# Patient Record
Sex: FEMALE | Race: WHITE | Employment: STUDENT | URBAN - METROPOLITAN AREA
[De-identification: names, ages, dates, MRNs, and addresses within clinical notes are randomized per-mention and may not be internally consistent; named-entity substitution may affect disease eponyms.]

---

## 2019-02-08 ENCOUNTER — APPOINTMENT (OUTPATIENT)
Dept: CT IMAGING | Age: 19
End: 2019-02-08
Attending: FAMILY MEDICINE
Payer: COMMERCIAL

## 2019-02-08 ENCOUNTER — HOSPITAL ENCOUNTER (OUTPATIENT)
Age: 19
Discharge: HOME | End: 2019-02-08
Attending: FAMILY MEDICINE
Payer: COMMERCIAL

## 2019-02-08 VITALS
HEIGHT: 60 IN | TEMPERATURE: 99 F | OXYGEN SATURATION: 98 % | WEIGHT: 170 LBS | DIASTOLIC BLOOD PRESSURE: 75 MMHG | BODY MASS INDEX: 33.38 KG/M2 | HEART RATE: 66 BPM | SYSTOLIC BLOOD PRESSURE: 122 MMHG | RESPIRATION RATE: 16 BRPM

## 2019-02-08 DIAGNOSIS — J01.00 ACUTE MAXILLARY SINUSITIS, RECURRENCE NOT SPECIFIED: Primary | ICD-10-CM

## 2019-02-08 LAB
#MXD IC: 0.7 X10ˆ3/UL (ref 0.1–1)
CREAT SERPL-MCNC: 0.6 MG/DL (ref 0.55–1.02)
GLUCOSE BLD-MCNC: 106 MG/DL (ref 70–99)
HCT VFR BLD AUTO: 40.2 % (ref 35–48)
HGB BLD-MCNC: 13.7 G/DL (ref 12–16)
ISTAT BUN: 9 MG/DL (ref 8–20)
ISTAT CHLORIDE: 104 MMOL/L (ref 101–111)
ISTAT HEMATOCRIT: 41 % (ref 34–50)
ISTAT IONIZED CALCIUM: 1.23 MMOL/L
ISTAT POTASSIUM: 3.9 MMOL/L (ref 3.6–5.1)
ISTAT SODIUM: 141 MMOL/L (ref 136–144)
LYMPHOCYTES # BLD AUTO: 3.1 X10ˆ3/UL (ref 1–4)
LYMPHOCYTES NFR BLD AUTO: 40.9 %
MCH RBC QN AUTO: 29.4 PG (ref 26–34)
MCHC RBC AUTO-ENTMCNC: 34.1 G/DL (ref 31–37)
MCV RBC AUTO: 86.3 FL (ref 80–100)
MIXED CELL %: 9 %
NEUTROPHILS # BLD AUTO: 3.7 X10ˆ3/UL (ref 1.5–7.7)
NEUTROPHILS NFR BLD AUTO: 50.1 %
PLATELET # BLD AUTO: 458 X10ˆ3/UL (ref 150–450)
RBC # BLD AUTO: 4.66 X10ˆ6/UL (ref 3.8–5.3)
WBC # BLD AUTO: 7.5 X10ˆ3/UL (ref 4–11)

## 2019-02-08 PROCEDURE — 99204 OFFICE O/P NEW MOD 45 MIN: CPT

## 2019-02-08 PROCEDURE — 96360 HYDRATION IV INFUSION INIT: CPT

## 2019-02-08 PROCEDURE — 85025 COMPLETE CBC W/AUTO DIFF WBC: CPT | Performed by: FAMILY MEDICINE

## 2019-02-08 PROCEDURE — 80047 BASIC METABLC PNL IONIZED CA: CPT

## 2019-02-08 PROCEDURE — 70450 CT HEAD/BRAIN W/O DYE: CPT | Performed by: FAMILY MEDICINE

## 2019-02-08 RX ORDER — SODIUM CHLORIDE 9 MG/ML
1000 INJECTION, SOLUTION INTRAVENOUS ONCE
Status: COMPLETED | OUTPATIENT
Start: 2019-02-08 | End: 2019-02-08

## 2019-02-08 RX ORDER — AMOXICILLIN AND CLAVULANATE POTASSIUM 875; 125 MG/1; MG/1
1 TABLET, FILM COATED ORAL 2 TIMES DAILY
Qty: 20 TABLET | Refills: 0 | Status: SHIPPED | OUTPATIENT
Start: 2019-02-08 | End: 2019-02-18

## 2019-02-08 NOTE — ED INITIAL ASSESSMENT (HPI)
Pt presents today with c/o frontal headache x 1 week. Pt denies any hx of migraines. Pt states that she is sensitive to light/sound. Pt denies any n/v or fevers.

## 2019-02-11 NOTE — ED PROVIDER NOTES
Patient Seen in: [id number] [facility]    History   Patient presents with:  Headache    Stated Complaint: headache  x  5 days    HPI  This is a 57-year-old female coming in with complaints of a frontal headache that she had for the last 1 week, Ears: normal TM's and external ear canals both ears  Nose: Nares normal. Septum midline. Mucosa normal. No drainage or sinus tenderness.   Throat: lips, mucosa moist, no obstruction  Neck: Supple, FROM   Back: symmetric  Lungs: clear to auscultation bilat tablet          Refill:  0    Ct Brain Or Head (93157)    Result Date: 2/8/2019  PROCEDURE:  CT BRAIN OR HEAD ([id number])  COMPARISON:  None. INDICATIONS:  headache  x  5 days  TECHNIQUE:  Noncontrast CT scanning is performed through the brain.  Dose reduction dryness and nosebleeds discussed - to avoid / reduce the risk of this use fragrance free vaseline to apply over the nasal septal mucosa)  · Daily antihistamine - Claritin OR Zyrtec in the AM (non-drowsy) and take Benadryl 25 mg at night time  · Avoid Sudaf

## 2019-03-30 ENCOUNTER — OFFICE VISIT (OUTPATIENT)
Dept: FAMILY MEDICINE CLINIC | Facility: CLINIC | Age: 19
End: 2019-03-30
Payer: COMMERCIAL

## 2019-03-30 VITALS
DIASTOLIC BLOOD PRESSURE: 60 MMHG | HEART RATE: 99 BPM | TEMPERATURE: 98 F | OXYGEN SATURATION: 99 % | SYSTOLIC BLOOD PRESSURE: 118 MMHG | RESPIRATION RATE: 16 BRPM

## 2019-03-30 DIAGNOSIS — R10.9 ACUTE LEFT FLANK PAIN: ICD-10-CM

## 2019-03-30 DIAGNOSIS — R39.9 UTI SYMPTOMS: Primary | ICD-10-CM

## 2019-03-30 LAB
MULTISTIX LOT#: ABNORMAL NUMERIC
NITRITE, URINE: POSITIVE
PH, URINE: 5.5 (ref 4.5–8)
SPECIFIC GRAVITY: 1.02 (ref 1–1.03)
UROBILINOGEN,SEMI-QN: 0.2 MG/DL (ref 0–1.9)

## 2019-03-30 PROCEDURE — 87186 SC STD MICRODIL/AGAR DIL: CPT | Performed by: PHYSICIAN ASSISTANT

## 2019-03-30 PROCEDURE — 81003 URINALYSIS AUTO W/O SCOPE: CPT | Performed by: PHYSICIAN ASSISTANT

## 2019-03-30 PROCEDURE — 87088 URINE BACTERIA CULTURE: CPT | Performed by: PHYSICIAN ASSISTANT

## 2019-03-30 PROCEDURE — 87086 URINE CULTURE/COLONY COUNT: CPT | Performed by: PHYSICIAN ASSISTANT

## 2019-03-30 PROCEDURE — 99202 OFFICE O/P NEW SF 15 MIN: CPT | Performed by: PHYSICIAN ASSISTANT

## 2019-03-30 RX ORDER — PHENAZOPYRIDINE HYDROCHLORIDE 200 MG/1
200 TABLET, FILM COATED ORAL 3 TIMES DAILY PRN
Qty: 6 TABLET | Refills: 0 | Status: SHIPPED | OUTPATIENT
Start: 2019-03-30 | End: 2019-04-01

## 2019-03-30 RX ORDER — CIPROFLOXACIN 500 MG/1
500 TABLET, FILM COATED ORAL 2 TIMES DAILY
Qty: 20 TABLET | Refills: 0 | Status: SHIPPED | OUTPATIENT
Start: 2019-03-30

## 2019-03-30 NOTE — PROGRESS NOTES
CHIEF COMPLAINT:   Patient presents with:  UTI: left side pain. 2 weeks      HPI:   Yolande Vela is a 19year old female who presents with symptoms of UTI and left side pain.  Complaining of urinary frequency, urgency, mild dysuria at the end GI: BS present x 4. No hepatosplenomegaly. : + suprapubic tenderness and left upper/mid abdomen on deep palpation. No bladder distention, + left CVA tenderness on alegria's punch.      Recent Results (from the past 24 hour(s))   URINALYSIS, AUTO, W/O SC Sig: Take 1 tablet (200 mg total) by mouth 3 (three) times daily as needed for Pain. Risk and benefits of medication discussed. Stressed importance of completing full course of antibiotic unless told otherwise.      Patient Instructions       Clement Avitia Bladder infections are not contagious. You can't get one from someone else, from a toilet seat, or from sharing a bath. The most common cause of bladder infections is bacteria from the bowels.  The bacteria get onto the skin around the opening of the ureth · Drink plenty of fluids to prevent dehydration and flush out of the bladder, unless you must restrict fluids for other medical reasons, or your doctor told you not to. · Proper cleaning after going to the bathroom is important.  Wipe from front to back af © 3730-2623 The [address] [place] [address], [address] [place]. All rights reserved. This information is not intended as a substitute for professional medical care. Always follow your healthcare professional's instructions.             Kid · Take antibiotics until they are used up, even if you feel better. It is important to finish them to make sure the infection is gone. · Unless another medicine was prescribed, you can use over-the-counter medicines for pain, fever, or discomfort.  If you · Improve your diet to prevent constipation. Eat more fruits, vegetables, and fiber. Eat less junk and fatty foods. Constipation can make a urinary tract infection more likely. Talk with your healthcare provider if you have trouble with bowel movements.   · Kidney Infection (Adult Female)    An infection in one or both kidneys is called \"pyelonephritis. \" It usually happens when bacteria (or rarely, viruses, fungi, or other disease-causing organisms) get into the kidney.  The bacteria (or other disease-causin · Unless another medicine was prescribed, you can use over-the-counter medicines for pain, fever, or discomfort. If you have chronic liver of kidney disease, talk with your healthcare provider before using these medicines.  Also talk with your provider if y · Urinate right after intercourse to flush out the bladder. Follow-up care  Follow up with your healthcare provider, or as advised. Additional testing may be needed to make sure the infection has cleared.  Close follow-up and further testing is very import The patient is asked to follow up with PCP  if not improving. Advised to go to ED with development of fever, vomiting, abdominal pain, gross hematuria or other worsening symptoms.

## 2019-03-30 NOTE — PATIENT INSTRUCTIONS
Bladder Infection, Female (Adult)    Normally, bacteria do not stay in the urine. But, when they do, the urine can become infected. This is called a urinary tract infection, or UTI.  An infection can occur anywhere in the urinary tract, from the kidney The most common cause of bladder infections is bacteria from the bowels. The bacteria get onto the skin around the opening of the urethra. From there it can get into the urine and travel up to the bladder, causing inflammation and infection.  This usually h · Proper cleaning after going to the bathroom is important. Wipe from front to back after using the toilet to prevent the spread of bacteria. · Urinate more frequently, and don't try to hold urine in for a long time.   · Wear loose fitting clothes and cott Kidney Infection (Adult Female)    An infection in one or both kidneys is called \"pyelonephritis. \" It usually happens when bacteria (or rarely, viruses, fungi, or other disease-causing organisms) get into the kidney.  The bacteria (or other disease-ca · Unless another medicine was prescribed, you can use over-the-counter medicines for pain, fever, or discomfort. If you have chronic liver of kidney disease, talk with your healthcare provider before using these medicines.  Also talk with your provider if y · Urinate right after intercourse to flush out the bladder. Follow-up care  Follow up with your healthcare provider, or as advised. Additional testing may be needed to make sure the infection has cleared.  Close follow-up and further testing is very import An infection in one or both kidneys is called \"pyelonephritis. \" It usually happens when bacteria (or rarely, viruses, fungi, or other disease-causing organisms) get into the kidney.  The bacteria (or other disease-causing organisms) can enter the kidneys

## (undated) NOTE — LETTER
Date & Time: 2/8/2019, 2:41 PM  Patient: Lobo Garza  Encounter Provider(s):    Gregg Tinajero MD       To Whom It May Concern:    Lobo Garza was seen and treated in our department on 2/8/2019.  She can return to [facility]